# Patient Record
Sex: MALE | URBAN - METROPOLITAN AREA
[De-identification: names, ages, dates, MRNs, and addresses within clinical notes are randomized per-mention and may not be internally consistent; named-entity substitution may affect disease eponyms.]

---

## 2022-02-10 ENCOUNTER — APPOINTMENT (OUTPATIENT)
Dept: URBAN - METROPOLITAN AREA CLINIC 319 | Age: 72
Setting detail: DERMATOLOGY
End: 2022-02-10

## 2022-02-10 DIAGNOSIS — L70.2 ACNE VARIOLIFORMIS: ICD-10-CM

## 2022-02-10 PROCEDURE — OTHER TREATMENT REGIMEN: OTHER

## 2022-02-10 PROCEDURE — OTHER PRESCRIPTION MEDICATION MANAGEMENT: OTHER

## 2022-02-10 PROCEDURE — 99204 OFFICE O/P NEW MOD 45 MIN: CPT

## 2022-02-10 PROCEDURE — OTHER MIPS QUALITY: OTHER

## 2022-02-10 PROCEDURE — OTHER COUNSELING: OTHER

## 2022-02-10 PROCEDURE — OTHER PRESCRIPTION: OTHER

## 2022-02-10 RX ORDER — SULFAMETHOXAZOLE AND TRIMETHOPRIM 800; 160 MG/1; MG/1
TABLET ORAL
Qty: 60 | Refills: 3 | Status: ERX | COMMUNITY
Start: 2022-02-10

## 2022-02-10 RX ORDER — KETOCONAZOLE 20 MG/ML
SHAMPOO, SUSPENSION TOPICAL
Qty: 120 | Refills: 11 | Status: ERX | COMMUNITY
Start: 2022-02-10

## 2022-02-10 RX ORDER — CLINDAMYCIN PHOSPHATE 10 MG/ML
SOLUTION TOPICAL
Qty: 60 | Refills: 3 | Status: ERX | COMMUNITY
Start: 2022-02-10

## 2022-02-10 NOTE — PROCEDURE: MIPS QUALITY
Quality 226: Preventive Care And Screening: Tobacco Use: Screening And Cessation Intervention: Patient screened for tobacco use and is an ex/non-smoker
Quality 431: Preventive Care And Screening: Unhealthy Alcohol Use - Screening: Patient not identified as an unhealthy alcohol user when screened for unhealthy alcohol use using a systematic screening method
Detail Level: Detailed
Quality 130: Documentation Of Current Medications In The Medical Record: Current Medications Documented
Quality 110: Preventive Care And Screening: Influenza Immunization: Influenza Immunization not Administered for Documented Reasons.
Quality 111:Pneumonia Vaccination Status For Older Adults: Pneumococcal Vaccination not Administered or Previously Received, Reason not Otherwise Specified

## 2022-02-10 NOTE — PROCEDURE: TREATMENT REGIMEN
Detail Level: Zone
Otc Regimen: PanOxyl wash- lather and wash back while in the shower
Initiate Treatment: ketoconazole 2 % shampoo Lather and wash scalp once daily while in the shower until clear.  Let sit for 5 minutes before rinsing\\n\\nclindamycin phosphate 1 % topical solution Apply a thin layer to areas of irritation on scalp twice daily until clear\\n\\nBactrim  mg-160 mg tablet Take 1 tablet by mouth twice daily until clear, when flared
Plan: Patient with few lesions today but not severely broken out today, if flared/ worsens after using prescribed regimen, follow up for further evaluation and additional treatment options.  The avoidance of long term antibiotic use has been discussed with patient today

## 2023-03-20 ENCOUNTER — APPOINTMENT (OUTPATIENT)
Dept: URBAN - METROPOLITAN AREA CLINIC 319 | Age: 73
Setting detail: DERMATOLOGY
End: 2023-03-20

## 2023-03-20 DIAGNOSIS — L70.2 ACNE VARIOLIFORMIS: ICD-10-CM

## 2023-03-20 PROCEDURE — OTHER PRESCRIPTION MEDICATION MANAGEMENT: OTHER

## 2023-03-20 PROCEDURE — OTHER TREATMENT REGIMEN: OTHER

## 2023-03-20 PROCEDURE — 99213 OFFICE O/P EST LOW 20 MIN: CPT

## 2023-03-20 PROCEDURE — OTHER COUNSELING: OTHER

## 2023-03-20 ASSESSMENT — SEVERITY ASSESSMENT OVERALL AMONG ALL PATIENTS
IN YOUR EXPERIENCE, AMONG ALL PATIENTS YOU HAVE SEEN WITH THIS CONDITION, HOW SEVERE IS THIS PATIENT'S CONDITION?: FEW INFLAMMATORY LESIONS, SOME NONINFLAMMATORY

## 2023-03-20 NOTE — PROCEDURE: TREATMENT REGIMEN
Plan: Patient with good improvement on exam today.  Says he only takes Bactrim every once in a while with good improvement of symptoms.  Given this we will continue on regimen as discussed.
Continue Regimen: As needed:\\n\\nketoconazole 2 % shampoo Lather and wash scalp once daily while in the shower until clear.  Let sit for 5 minutes before rinsing\\n\\nclindamycin phosphate 1 % topical solution Apply a thin layer to areas of irritation on scalp twice daily until clear\\n\\nBactrim  mg-160 mg tablet Take 1 tablet by mouth twice daily until clear, when flared
Otc Regimen: PanOxyl wash- lather and wash back while in the shower
Detail Level: Zone